# Patient Record
Sex: FEMALE | URBAN - METROPOLITAN AREA
[De-identification: names, ages, dates, MRNs, and addresses within clinical notes are randomized per-mention and may not be internally consistent; named-entity substitution may affect disease eponyms.]

---

## 2024-02-26 ENCOUNTER — TELEPHONE (OUTPATIENT)
Dept: PRIMARY CARE CLINIC | Age: 49
End: 2024-02-26

## 2024-02-26 NOTE — TELEPHONE ENCOUNTER
----- Message from Roman Hall Quentin sent at 2/26/2024  9:20 AM EST -----  Regarding: [facility] Appointment Request  [facility] Appointment Request    Patient needs appointment for [facility] Appointment Type: New Patient.    Reason for Appointment Request: No appointments available during search  --------------------------------------------------------------------------------------------------------------------------    Additional Information: New Patient wanted to set an appointment with Dr Diana June or Dr Steven Monae MD to establish Primary Care Provider.    Relationship to Patient: Third Party Luis Monge     Call Back Information: OK to leave message on voicemail  Preferred Call Back Number: Phone [phone]